# Patient Record
Sex: FEMALE | Race: WHITE | NOT HISPANIC OR LATINO | Employment: FULL TIME | URBAN - METROPOLITAN AREA
[De-identification: names, ages, dates, MRNs, and addresses within clinical notes are randomized per-mention and may not be internally consistent; named-entity substitution may affect disease eponyms.]

---

## 2017-02-07 ENCOUNTER — HOSPITAL ENCOUNTER (OUTPATIENT)
Dept: OBGYN | Facility: HOSPITAL | Age: 22
Discharge: HOME | End: 2017-02-07
Attending: OBSTETRICS & GYNECOLOGY | Admitting: OBSTETRICS & GYNECOLOGY

## 2017-02-07 ASSESSMENT — MIFFLIN-ST. JEOR: SCORE: 1762.4

## 2017-03-28 ENCOUNTER — ANESTHESIA - HEALTHEAST (OUTPATIENT)
Dept: OBGYN | Facility: CLINIC | Age: 22
End: 2017-03-28

## 2017-03-29 ENCOUNTER — HOME CARE/HOSPICE - HEALTHEAST (OUTPATIENT)
Dept: HOME HEALTH SERVICES | Facility: HOME HEALTH | Age: 22
End: 2017-03-29

## 2017-03-30 ENCOUNTER — HOME CARE/HOSPICE - HEALTHEAST (OUTPATIENT)
Dept: HOME HEALTH SERVICES | Facility: HOME HEALTH | Age: 22
End: 2017-03-30

## 2017-05-05 ENCOUNTER — RECORDS - HEALTHEAST (OUTPATIENT)
Dept: LAB | Facility: HOSPITAL | Age: 22
End: 2017-05-05

## 2017-05-08 LAB — HBV SURFACE AB SERPL IA-ACNC: NEGATIVE M[IU]/ML

## 2017-07-14 ENCOUNTER — OFFICE VISIT - HEALTHEAST (OUTPATIENT)
Dept: INTERNAL MEDICINE | Facility: CLINIC | Age: 22
End: 2017-07-14

## 2017-07-14 DIAGNOSIS — F41.9 ANXIETY: ICD-10-CM

## 2017-07-14 DIAGNOSIS — F32.A DEPRESSION: ICD-10-CM

## 2017-07-14 DIAGNOSIS — F41.0 PANIC ATTACKS: ICD-10-CM

## 2017-11-10 ENCOUNTER — OFFICE VISIT - HEALTHEAST (OUTPATIENT)
Dept: FAMILY MEDICINE | Facility: CLINIC | Age: 22
End: 2017-11-10

## 2017-11-10 DIAGNOSIS — J02.0 STREP PHARYNGITIS: ICD-10-CM

## 2017-11-10 DIAGNOSIS — R09.81 SINUS CONGESTION: ICD-10-CM

## 2017-11-21 ENCOUNTER — OFFICE VISIT - HEALTHEAST (OUTPATIENT)
Dept: FAMILY MEDICINE | Facility: CLINIC | Age: 22
End: 2017-11-21

## 2017-11-21 DIAGNOSIS — J01.90 ACUTE SINUSITIS: ICD-10-CM

## 2018-03-21 ENCOUNTER — COMMUNICATION - HEALTHEAST (OUTPATIENT)
Dept: SCHEDULING | Facility: CLINIC | Age: 23
End: 2018-03-21

## 2019-06-26 ENCOUNTER — COMMUNICATION - HEALTHEAST (OUTPATIENT)
Dept: SCHEDULING | Facility: CLINIC | Age: 24
End: 2019-06-26

## 2019-06-26 DIAGNOSIS — B85.0 HEAD LICE: ICD-10-CM

## 2021-01-06 LAB
ABO (EXTERNAL): NORMAL
HEMOGLOBIN (EXTERNAL): 13.7 G/DL (ref 12–16)
HEPATITIS B SURFACE ANTIGEN (EXTERNAL): NONREACTIVE
HIV1+2 AB SERPL QL IA: NEGATIVE
PLATELET COUNT (EXTERNAL): 421 10E3/UL (ref 150–400)
RH (EXTERNAL): POSITIVE
VDRL (SYPHILIS) (EXTERNAL): NONREACTIVE

## 2021-05-30 ENCOUNTER — RECORDS - HEALTHEAST (OUTPATIENT)
Dept: ADMINISTRATIVE | Facility: CLINIC | Age: 26
End: 2021-05-30

## 2021-05-30 VITALS — BODY MASS INDEX: 33.9 KG/M2 | WEIGHT: 216 LBS | HEIGHT: 67 IN

## 2021-05-30 NOTE — TELEPHONE ENCOUNTER
CC: Suspected Lice      No open wounds   Sees eggs and adults       Requesting RX for herself     Indicated her insurance would probably pay for OTC Nix if was prescribed       A/P:   > Reviewed approach to managing this in the home and use of NIX (or similar) per guidelines     > I will message your provider for Rx        Hua Solo RN   Triage and Medication Refills      Confirmed pharmacy       Hua Solo RN   Triage and Medication Refills      Reason for Disposition    [1] New-onset head lice AND [2] usually respond to OTC meds in community    Protocols used: HEAD LICE-A-AH

## 2021-05-31 VITALS — BODY MASS INDEX: 35.68 KG/M2 | WEIGHT: 227.8 LBS

## 2021-05-31 VITALS — BODY MASS INDEX: 35.79 KG/M2 | WEIGHT: 228.5 LBS

## 2021-05-31 VITALS — WEIGHT: 216.8 LBS | BODY MASS INDEX: 33.96 KG/M2

## 2021-06-06 ENCOUNTER — HOSPITAL ENCOUNTER (OUTPATIENT)
Dept: OBGYN | Facility: HOSPITAL | Age: 26
Discharge: HOME | End: 2021-06-06
Attending: OBSTETRICS & GYNECOLOGY | Admitting: OBSTETRICS & GYNECOLOGY

## 2021-06-06 ENCOUNTER — RECORDS - HEALTHEAST (OUTPATIENT)
Dept: ADMINISTRATIVE | Facility: OTHER | Age: 26
End: 2021-06-06

## 2021-06-06 RX ORDER — SWAB
1 SWAB, NON-MEDICATED MISCELLANEOUS DAILY
Status: SHIPPED | COMMUNITY
Start: 2021-06-06

## 2021-06-06 ASSESSMENT — MIFFLIN-ST. JEOR: SCORE: 2014.14

## 2021-06-08 NOTE — PROGRESS NOTES
[place] [facility] [place] states she was walking up my drive way and I slipped and fell right on my belly at approx 1600.  Pt denies any bleeding pt states she thinks she was leaking fluid it was clear, pt states she is not wearing a pd at this time. Pt states she hasn't felt baby move pt states she felt baby move prior to fall. Pt states she has had a gallon and a half of water today.  Pt placed on monitor.pt states she has not  Had intercourse in the last 24-48 hours  1840 pt off monitor to u/s via w/c.

## 2021-06-08 NOTE — PROGRESS NOTES
Dr. YUSRA Pappas notified of patient ROM plus results being negative, BPP of 8/8, MARCO of 15.4, patient is feeling baby move around a lot more now, and Reactive EFM. Dr. SHEREEN Pappas states that it is ok to discharge patient. Patient is to make an appointment to see him in the clinic before the weekend. Patient notified of test results and the need to schedule an appointment with Dr. Pappas before the weekend. Discharge papers printed and gone over with the patient. Questions answered and papers signed.

## 2021-06-09 NOTE — ANESTHESIA POSTPROCEDURE EVALUATION
Patient: Radha Curran  * No procedures listed *  Anesthesia type: regional    Patient location: Labor and Delivery  Last vitals:   Vitals:    03/28/17 0801   BP: 133/66   Pulse: 96   Resp:    Temp:    SpO2:      Post vital signs: stable  Level of consciousness: awake and responds to simple questions  Post-anesthesia pain: pain controlled  Post-anesthesia nausea and vomiting: no  Pulmonary: unassisted, return to baseline  Cardiovascular: stable and blood pressure at baseline  Hydration: adequate  Anesthetic events: no    QCDR Measures:  ASA# 11 - Dora-op Cardiac Arrest: ASA11B - Patient did NOT experience unanticipated cardiac arrest  ASA# 12 - Dora-op Mortality Rate: ASA12B - Patient did NOT die  ASA# 13 - PACU Re-Intubation Rate: ASA13B - Patient did NOT require a new airway mgmt  ASA# 10 - Composite Anes Safety: ASA10A - No serious adverse event  ASA# 38 - New Corneal Injury: ASA38A - No new exposure keratitis or corneal abrasion in PACU    Additional Notes:

## 2021-06-09 NOTE — ANESTHESIA PROCEDURE NOTES
Epidural Block    Patient location during procedure: OB  Time Called: 3/28/2017 2:04 AM  Reason for Block:labor epidural  Staffing:  Performing  Anesthesiologist: SEBASTIAN BURNHAM  Preanesthetic Checklist  Completed: patient identified, risks, benefits, and alternatives discussed, timeout performed, consent obtained, airway assessed, oxygen available, suction available, emergency drugs available and hand hygiene performed  Procedure  Patient position: sitting  Prep: ChloraPrep  Patient monitoring: continuous pulse oximetry, heart rate and blood pressure  Approach: midline  Location: L3-L4  Injection technique: ISABEL air  Number of Attempts:1  Needle  Needle type: Shell   Needle gauge: 17 G     Catheter in Space: 6  Assessment  Sensory level: T10  No complications

## 2021-06-09 NOTE — ANESTHESIA PREPROCEDURE EVALUATION
Anesthesia Evaluation      Patient summary reviewed     Airway   Mallampati: II  Neck ROM: full   Pulmonary - negative ROS and normal exam    breath sounds clear to auscultation                         Cardiovascular - negative ROS and normal exam  Rhythm: regular  Rate: normal,         Neuro/Psych - negative ROS     Endo/Other    (+) pregnant     GI/Hepatic/Renal - negative ROS           Dental - normal exam                        Anesthesia Plan  Planned anesthetic: epidural    ASA 2     Anesthetic plan and risks discussed with: patient    Post-op plan: routine recovery

## 2021-06-14 NOTE — PROGRESS NOTES
Assessment:      Strep throat.  Sinus congestion     Plan:      Patient placed on antibiotics.  Use of OTC analgesics recommended as well as salt water gargles.   Trial of nasal steroid and sudafed  Discussed signs of worsening infection and when to follow-up with PCP if no symptom improvement.     Patient Instructions   Sinus congestion    You were seen today for sinus congestion and/or pain. This is likely due to a viral illness.    Symptoms management:  - May use Tylenol or Ibuprofen for discomfort and/or fever if present  - May try saline irrigation to relieve congestion (see instructions below)  - Use of nasal steroids as prescribed  - If you are experiencing ear fullness, may try an oral decongestant such as sudafed    Reasons to come back for re-evaluation:  - Develop a fever of 102F (38.9C)  - Sudden and severe pain in the face and head  - Troubles seeing or double vision  - Swelling or redness around one or both eyes  - Sfiff neck  - Symptoms have not improved after 7 days    Buffered normal saline nasal irrigation   The benefits   1. Saline (saltwater) washes the mucus and irritants from your nose.   2. The sinus passages are moisturized.   3. Studies have also shown that a nasal irrigation improves cell function (the cells that move the mucus work better).   The recipe   Use a one-quart glass jar that is thoroughly cleansed.   You may use a large medical syringe (30 cc), water pick with an irrigation tip (preferred method), squeeze bottle, or Neti pot. Do not use a baby bulb syringe. The syringe or pick should be sterilized frequently or replaced every two to three weeks to avoid contamination and infection.   Fill with water that has been distilled, previously boiled, or otherwise sterilized. Plain tap water is not recommended, because it is not necessarily sterile.   Add 1 to 1  heaping teaspoons of pickling/skye salt. Do not use table salt, because it contains a large number of additives.   Add 1  teaspoon of baking soda (pure bicarbonate).   Mix ingredients together, and store at room temperature. Discard after one week.   You may also make up a solution from premixed packets that are commercially prepared specifically for nasal irrigation.   The instructions   Irrigate your nose with saline one to two times per day.   If you have been told to use nasal medication, you should always use your saline solution first. The nasal medication is much more effective when sprayed onto clean nasal membranes, and the spray will reach deeper into the nose.   Pour the amount of fluid you plan to use into a clean bowl. Do not put your used syringe back into the storage container, because it contaminates your solution.   You may warm the solution slightly in the microwave, but be sure that the solution is not hot.   Bend over the sink (some people do this in the shower) and squirt the solution into each side of your nose, aiming the stream toward the back of your head, not the top of your head. The solution should flow into one nostril and out of the other, but it will not harm you if you swallow a little.   Some people experience a little burning sensation the first few times they use buffered saline solution, but this usually goes away after they adapt to it.     Sore throat    Rapid Strep test was negative. We will still treat with a course of antibiotics based on your symptoms.    No notification means that overnight test returned negative.    May continue with symptomatic treatments including:  -salt water gargles  -warm beverages such as tea  -throat drops  -ibuprofen or Tylenol for pain or fever  -take full course of antibiotics, even if symptoms improve    If fevers not coming down with Tylenol or ibuprofen, shortness of breath, not tolerating oral liquid intake, drooling, or stiff neck, return for evaluation immediately. Otherwise, if no improvement in the next week, follow up with primary care  provider.        Subjective:       Radha Curran is a 22 y.o. female who presents for evaluation of a sore throat. Associated symptoms include change in voice, difficulty swallowing, and sinus pressure. Onset of symptoms was 4 days ago, gradually worsening since that time.  She is drinking plenty of fluids. She has not had recent close exposure to someone with proven streptococcal pharyngitis. Denies fevers, ear pain, cough, and vomiting.    The following portions of the patient's history were reviewed and updated as appropriate: allergies, current medications and problem list.    Review of Systems  Pertinent items are noted in HPI.    Allergies  No Known Allergies     Objective:      Vitals:    11/10/17 1226   BP: 100/60   Pulse: 86   Resp: 16   Temp: 98.1  F (36.7  C)   SpO2: 99%     General appearance: alert, appears stated age, cooperative, no distress and sounds congestion  Head: Normocephalic, without obvious abnormality, atraumatic  Ears: normal TM's and external ear canals both ears  Nose: sinuses tender to percussion R>L; nares normal, septum midline, no discharge present  Throat: moderate tonsil swelling bilaterally; exudate present on right; erythematous posterior oropharynx; lips and tongue normal  Neck: marked anterior cervical adenopathy and supple, symmetrical, trachea midline  Lungs: clear to auscultation bilaterally and no rhonchi, rales, or wheezing  Heart: regular rate and rhythm, S1, S2 normal, no murmur, click, rub or gallop    Lab Results    Negative rapid Group A strep    I personally reviewed these results and discussed findings with the patient.

## 2021-06-14 NOTE — PROGRESS NOTES
Chief Complaint   Patient presents with     Nasal Congestion     here 2 weeks ago done with Rx pt has not gotten any better        HPI:    Patient is here for 2 wks of nasal congestion with facial pressure, productive cough, and moderate sore throat, not improved after treatment for Strep throat with PCN. No fever, chills, chest pain, shortness of breath.    ROS: Pertinent ROS noted in HPI.     No Known Allergies    Patient Active Problem List   Diagnosis     Depression     Anxiety       Family History   Problem Relation Age of Onset     Alcohol abuse Mother      Drug abuse Mother      Alcohol abuse Father      Drug abuse Father      Diabetes Maternal Grandmother      Depression Maternal Grandmother      Anxiety disorder Maternal Grandmother      Drug abuse Maternal Grandmother      Alcohol abuse Maternal Grandmother      Drug abuse Maternal Grandfather      Alcohol abuse Maternal Grandfather      Alcohol abuse Paternal Grandmother      Alcohol abuse Paternal Grandfather        Social History     Social History     Marital status: Single     Spouse name: N/A     Number of children: 0     Years of education: N/A     Occupational History     Nursing Assistant       Social History Main Topics     Smoking status: Never Smoker     Smokeless tobacco: Never Used     Alcohol use No     Drug use: No     Sexual activity: Yes     Partners: Male     Birth control/ protection: Pill      Comment: just stop taking the pill last month      Other Topics Concern     Not on file     Social History Narrative       Objective:    Vitals:    11/21/17 0751   BP: 122/58   Pulse: (!) 105   Temp: 97.8  F (36.6  C)   SpO2: 99%     Gen:NAD  Oropharynx: normal throat, oral mucosa  Ears: normal TMs and canals  Nose: boggy nasal mucosa with purulent discharges  Sinus: pain to percussion of maxillary sinuses bilaterally  Neck:NAD  CV:RRR, no M, R, G  Pulm: CTAB, normal effort        Acute sinusitis  -     amoxicillin-clavulanate (AUGMENTIN) 875-125  mg per tablet; Take 1 tablet by mouth 2 (two) times a day for 10 days.  -     predniSONE (DELTASONE) 20 MG tablet; Take 2 tablets (40 mg total) by mouth daily for 5 days.        F/u in 5 days with PCP if no improvement.

## 2021-06-16 PROBLEM — F32.A DEPRESSION: Status: ACTIVE | Noted: 2017-07-23

## 2021-06-16 PROBLEM — F41.9 ANXIETY: Status: ACTIVE | Noted: 2017-07-23

## 2021-06-25 NOTE — PROGRESS NOTES
Progress Notes by Chano Simms at 7/14/2017 11:00 AM     Author: Chano Simms Service: -- Author Type: Nurse Practitioner    Filed: 7/23/2017  1:32 PM Encounter Date: 7/14/2017 Status: Signed    : Chano Simms [facility]    Date of Service: 7/14/2017  Primary Provider: HERMES Galeano    Patient Care Team:  HERMES Recio as PCP - General (Nurse Practitioner)     ______________________________________________________________________     Patient's Pharmacy:    [facility] - [place], [place] - [address] [place] [address]  Phone: [phone] Fax: [phone]    [id number] - [place], [place] - [address] AT [facility] [address]  [address]  [place] [address]  Phone: [phone] Fax: [phone]     Patient's Insurance:    Payor: BLUE CROSS / Plan: BLUE PLUS [place] / Product Type: PMAP /     ______________________________________________________________________    Assessment:    1. Depression    2. Anxiety    3. Panic attacks       ______________________________________________________________________      PHQ-2 Total Score: 6 (7/14/2017 10:00 AM)  PHQ-9 Total Score: 19 (7/14/2017 10:00 AM)     Plan:  Patient Instructions   1. Venlafaxine XR 37.5 mg by mouth - Take 1 cap by mouth daily x 1 week, increase to 2 caps daily until seen in follow up.  2. Lorazepam 0.5 mg by mouth every 6 hours as needed for increased anxiety and or panic attacks.  3. Continue therapy as before.    CRISIS LINE: [phone]   [facility]   [address] [place] [address]    Call the crisis line for immediate mental health support, 24 hours a day.   Crisis line staff might recommend a visit to [facility], or connect you with a mobile crisis team in your community      If you or someone you know is  "experiencing a medical emergency dial 911      ______________________________________________________________________     Radha Curran is 22 y.o. female who comes in today for:    Chief Complaint   Patient presents with   ? Depression     nothing new, was on prozac when she was younger but then didnt work and she gained a lot of weight and has gotten worse   ? Anxiety       Patient Active Problem List   Diagnosis   ? Depression   ? Anxiety     No past medical history on file.    Past Surgical History:   Procedure Laterality Date   ? ANTERIOR CRUCIATE LIGAMENT REPAIR Right      Current Outpatient Prescriptions   Medication Sig   ? LORazepam (ATIVAN) 0.5 MG tablet Take 1 tablet (0.5 mg total) by mouth every 6 (six) hours as needed for anxiety.   ? venlafaxine (EFFEXOR XR) 37.5 MG 24 hr capsule Take 1 cap (37.5 mg) by mouth daily x 1 week, then increase to 2 caps (75 mg) daily.     Social History     Social History   ? Marital status: Single     Spouse name: N/A   ? Number of children: 0   ? 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 Smoking status: Never Smoker   ? Smokeless tobacco: Never Used   ? Alcohol use No   ? Drug use: No   ? Sexual activity: Yes     Partners: Male     Birth control/ protection: Pill      Comment: just stop taking the pill last month      Other Topics Concern   ? Not on file     Social History Narrative     ______________________________________________________________________     History of present illness: Radha Curran is a pleasant 22 y.o. female who presents in clinic today for evaluation depression and anxiety.  She has been having more frequent panic attacks , anxiety and also depression.  She states that her parents are \"addicts\" - drug and chem dependency, both just started drug & alcohol treatment yesterday.  She had been taking prozac for about 2 years (age 18 yrs) and just wasn't working any longer, so she " weaned herself off of the medication about 2 years ago (age 20 yrs).  She states that she also gained a lot of weight and had increased depression and suicidal thoughts at that time. In the interim, she has been going to Psychologist weekly the last 4 months, has seen a few different providers, but most recently seeing Dr. Mirza Littlejohn at [facility] in [place].  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  She has tried alprazolam, her grandmother's, which made her feel high and relaxed on it and just wanted to sleep.  With regards to panic attacks, she has symptoms of shaking, fidgety, and heart races, and feels like she just can't sit still.  She has had them at work, home, and in social situations where she has had to leave. She will call her therapist to talk to somebody and has called her fiance to talk about it.  She also removes herself from the inciting situation and will take slow deep breaths to try to calm herself.  Family history of depression in her mother as a teen as well as her maternal grandmother with anxiety and depression and also her paternal grandmother with anxiety/depression.  Weight is back up and noted below.    *[place] reviewed today and no inappropriate prescriptions identified.    Review of systems:   On ROS, the patient denies headaches, auditory/visual hallucinations, chest pain, shortness of breath, suicidal or homicidal ideation or any plans to do so.  Positive for symptoms as noted in the HPI.    ______________________________________________________________________    Wt Readings from Last 3 Encounters:   07/14/17 216 lb 12.8 oz (98.3 kg)   03/27/17 173 lb (78.5 kg)   02/07/17 216 lb (98 kg) (98 %, Z= 2.16)*     * Growth percentiles are based on CDC 2-20 Years  data.     BP Readings from Last 3 Encounters:   07/14/17 110/68   03/30/17 108/66   03/29/17 107/79     /68 (Patient Site: Right Arm, Patient Position: Sitting, Cuff Size: Adult Large)  Pulse 66  Wt 216 lb 12.8 oz (98.3 kg)  LMP 06/27/2017  Breastfeeding? No  BMI 33.96 kg/m2     Physical Exam:  General Appearance: Alert, cooperative, no distress, appears stated age  Head: Normocephalic, without obvious abnormality, atraumatic  Eyes: PERRL, conjunctiva/corneas clear, EOM's intact  Nose: Nares normal, septum midline,mucosa normal, no drainage  Throat: Lips, mucosa, and tongue normal; teeth and gums normal, no erythema, exudate, or thrush  Neck: Supple, symmetrical, trachea midline, no adenopathy;  thyroid: not enlarged, symmetric, no tenderness/mass/nodules  Lungs: Clear to auscultation bilaterally, respirations unlabored  Heart: Regular rate and rhythm, S1 and S2 normal, no murmur, rub, or gallop  Abdomen: Soft, non-tender, bowel sounds active all four quadrants  Lymph nodes: Cervical & supraclavicular nodes normal  Neurologic: She is alert & oriented x 3.  Psychiatric: She has a normal mood and affect.       Chano Simms CNP  Internal Medicine  [facility]     Return in about 6 weeks (around 8/25/2017), or if symptoms worsen or fail to improve.

## 2021-06-26 NOTE — PROGRESS NOTES
Dr No in to evaluate patient.  Discharge orders obtained.   Discharge instructions reviewed with patient and patient understanding of plan and currently has next clinic visit scheduled for 6/10/2021.  No other questions or concerns expressed.

## 2021-06-26 NOTE — H&P
OB TRIAGE    Date: 2021  NAME: Radha Curran  : 1995  MRN: [id number]     CC: foot swelling     HPI: Radha Curran is a 26 y.o. female,  with  single inter-uterine gestation at 33w6d, with Estimated Date of Delivery: 21. She presented to L&D with a swollen foot this morning that is now resolved . She was unable to put her shoe on this morning however the swelling is now completely resolved.  She denies tenderness or redness in the leg.   Pregnancy has been complicated by gestational hypertension. Patient denies headache, visual changes, RUQ pain. She reports good fetal movement.    OB HISTORY   OB History    Para Term  AB Living   3 1 1   1 1   SAB TAB Ectopic Multiple Live Births   1     0 1      # Outcome Date GA Lbr /2nd Weight Sex Delivery Anes PTL Lv   3 Current            2 Term 17 39w4d 12:00 / 01:01 8 lb 1 oz (3.657 kg) F Vag-Vacuum EPI N RIC   1 SAB                PAST MEDICAL HISTORY:  History reviewed. No pertinent past medical history.     PAST SURGICAL HISTORY:   Past Surgical History:   Procedure Laterality Date     ANTERIOR CRUCIATE LIGAMENT REPAIR Right     Patient reports no surgery, external cast only        SOCIAL HISTORY  Reviewed, patient denies smoking, alcohol and drug use  She is  . Father is  involved    MEDICATIONS  No current facility-administered medications on file prior to encounter.      Current Outpatient Medications on File Prior to Encounter   Medication Sig Dispense Refill     prenatal vitamin iron-folic acid 27mg-0.8mg (PRENATAL S) 27 mg iron- 800 mcg Tab tablet Take 1 tablet by mouth daily.       [DISCONTINUED] LORazepam (ATIVAN) 0.5 MG tablet Take 1 tablet (0.5 mg total) by mouth every 6 (six) hours as needed for anxiety. 60 tablet 0     [DISCONTINUED] permethrin (NIX) 1 % liquid Apply to scalp/hair and leave on for 10 minutes then rinse. Repeat in 7 days if not resolved 1 Bottle 1       ALLERGIES  Allergies   Allergen  Reactions     Fentanyl Nausea And Vomiting     Projectile vomiting       ROS: otherwise negative except what is stated in HPI.     PHYSICAL EXAM   /61   Pulse 89   Temp 98  F (36.7  C) (Oral)   Resp 18   LMP 10/12/2020    Gen: no acute distress, resting comfortably   CV: acyanotic   Heart: regular rate and rhythm   Pulm: unlabored respirations    Abd: gravid   Cervix: deferred  Extremities: soft, no edema, no erythema, no tenderness  FHR: positive, category 1  Tira: no contractions      LABS  Prenatal Labs  Result Component Result Previous Result   ABO/Rh External Result A Positive (2016) No Results    A Positive (2016)    Hemoglobin External Result 12.4 (2017) 13.2 (2016)   Rubella External Result Immune (2016) No Results        IMPRESSION  26 y.o.  at 33w6d   Gestational hypertension   - normal blood pressure in triage  Unilateral foot swelling - now resolved      PLAN  DVT warning signs discussed  Follow up as scheduled with Dr. Dwain No, DO

## 2021-06-26 NOTE — PROGRESS NOTES
Radha, , at 33 6/7 weeks here today due to right ankle/foot swelling noted this morning upon getting out of bed.  She states she was unable to get her sandals on.  When she arrived this afternoon the swelling had resolved, but she had spoken to the doctor on call who wanted her to be evaluated.  Radha reports some elevated BP readings in the clinic of which she reports they had drawn labs for and had her get BPP last week, Thursday.  She reports she was told things were normal.   working on obtaining prenatal records.      Dr No, In House [facility], informed of patient arrival and assessment.  BP WNL in 120s/60s.  [place] denies any headaches, visual changes or epigastric pains.  LE edema non pitting and equal bilaterally.  Reflexes UE and LE WNL +2 and no clonus.  No complaints of leg tenderness or pain, no redness noted.

## 2021-06-27 ENCOUNTER — HEALTH MAINTENANCE LETTER (OUTPATIENT)
Age: 26
End: 2021-06-27

## 2021-06-29 LAB — GROUP B STREPTOCOCCUS (EXTERNAL): NEGATIVE

## 2021-07-06 VITALS — BODY MASS INDEX: 40.62 KG/M2 | WEIGHT: 268 LBS | HEIGHT: 68 IN

## 2021-07-07 ENCOUNTER — HOSPITAL ENCOUNTER (OUTPATIENT)
Dept: OBGYN | Facility: CLINIC | Age: 26
Discharge: HOME | End: 2021-07-07
Attending: OBSTETRICS & GYNECOLOGY | Admitting: OBSTETRICS & GYNECOLOGY

## 2021-07-07 DIAGNOSIS — H66.002 NON-RECURRENT ACUTE SUPPURATIVE OTITIS MEDIA OF LEFT EAR WITHOUT SPONTANEOUS RUPTURE OF TYMPANIC MEMBRANE: ICD-10-CM

## 2021-07-07 RX ORDER — AMOXICILLIN 500 MG/1
500 CAPSULE ORAL 2 TIMES DAILY
Qty: 10 CAPSULE | Refills: 0 | Status: SHIPPED | OUTPATIENT
Start: 2021-07-07 | End: 2021-07-12

## 2021-07-08 ENCOUNTER — DOCUMENTATION ONLY (OUTPATIENT)
Dept: ADMINISTRATIVE | Facility: OTHER | Age: 26
End: 2021-07-08

## 2021-07-08 NOTE — PROGRESS NOTES
This encounter was created as part of manual pregnancy episode data conversion for the single EHR project. The following information (where applicable) was manually abstracted from the [employer] Epic instance on July 8, 2021: pregnancy episode name/date, dating, episode encounter linking, pregravid weight, number of fetuses, and pregnancy overview and plan.     Genoveva Quiros RN   Clinical Informatics

## 2021-07-16 ENCOUNTER — LAB (OUTPATIENT)
Dept: LAB | Facility: CLINIC | Age: 26
End: 2021-07-16
Attending: OBSTETRICS & GYNECOLOGY
Payer: COMMERCIAL

## 2021-07-16 DIAGNOSIS — Z33.1 PREGNANT STATE, INCIDENTAL: ICD-10-CM

## 2021-07-16 DIAGNOSIS — Z33.1 PREGNANT STATE, INCIDENTAL: Primary | ICD-10-CM

## 2021-07-16 PROCEDURE — U0005 INFEC AGEN DETEC AMPLI PROBE: HCPCS

## 2021-07-16 PROCEDURE — U0003 INFECTIOUS AGENT DETECTION BY NUCLEIC ACID (DNA OR RNA); SEVERE ACUTE RESPIRATORY SYNDROME CORONAVIRUS 2 (SARS-COV-2) (CORONAVIRUS DISEASE [COVID-19]), AMPLIFIED PROBE TECHNIQUE, MAKING USE OF HIGH THROUGHPUT TECHNOLOGIES AS DESCRIBED BY CMS-2020-01-R: HCPCS

## 2021-07-18 LAB — SARS-COV-2 RNA RESP QL NAA+PROBE: NEGATIVE

## 2021-07-19 ENCOUNTER — ANESTHESIA (OUTPATIENT)
Dept: OBGYN | Facility: CLINIC | Age: 26
End: 2021-07-19
Payer: COMMERCIAL

## 2021-07-19 ENCOUNTER — ANESTHESIA EVENT (OUTPATIENT)
Dept: OBGYN | Facility: CLINIC | Age: 26
End: 2021-07-19
Payer: COMMERCIAL

## 2021-07-19 ENCOUNTER — HOSPITAL ENCOUNTER (INPATIENT)
Facility: CLINIC | Age: 26
LOS: 2 days | Discharge: HOME | End: 2021-07-21
Attending: OBSTETRICS & GYNECOLOGY | Admitting: OBSTETRICS & GYNECOLOGY
Payer: COMMERCIAL

## 2021-07-19 DIAGNOSIS — Z3A.40 40 WEEKS GESTATION OF PREGNANCY: Primary | ICD-10-CM

## 2021-07-19 PROBLEM — Z34.90 PREGNANCY: Status: ACTIVE | Noted: 2021-07-19

## 2021-07-19 PROBLEM — Z36.89 ENCOUNTER FOR TRIAGE IN PREGNANT PATIENT: Status: ACTIVE | Noted: 2021-07-19

## 2021-07-19 LAB
ABO/RH(D): NORMAL
ANTIBODY SCREEN: NEGATIVE
APTT PPP: 28 SECONDS (ref 22–38)
D DIMER PPP FEU-MCNC: >20 UG/ML FEU (ref 0–0.5)
FIBRINOGEN PPP-MCNC: 438 MG/DL (ref 170–490)
HGB BLD-MCNC: 7.5 G/DL (ref 11.7–15.7)
HGB BLD-MCNC: 9.1 G/DL (ref 11.7–15.7)
INR PPP: 1.18 (ref 0.85–1.15)
PLATELET # BLD AUTO: 376 10E3/UL (ref 150–450)
SPECIMEN EXPIRATION DATE: NORMAL

## 2021-07-19 PROCEDURE — 250N000011 HC RX IP 250 OP 636

## 2021-07-19 PROCEDURE — 85384 FIBRINOGEN ACTIVITY: CPT | Performed by: OBSTETRICS & GYNECOLOGY

## 2021-07-19 PROCEDURE — 258N000003 HC RX IP 258 OP 636

## 2021-07-19 PROCEDURE — 86900 BLOOD TYPING SEROLOGIC ABO: CPT

## 2021-07-19 PROCEDURE — 0HQ9XZZ REPAIR PERINEUM SKIN, EXTERNAL APPROACH: ICD-10-PCS | Performed by: OBSTETRICS & GYNECOLOGY

## 2021-07-19 PROCEDURE — 85049 AUTOMATED PLATELET COUNT: CPT | Performed by: OBSTETRICS & GYNECOLOGY

## 2021-07-19 PROCEDURE — 999N000016 HC STATISTIC ATTENDANCE AT DELIVERY

## 2021-07-19 PROCEDURE — 3E0R3BZ INTRODUCTION OF ANESTHETIC AGENT INTO SPINAL CANAL, PERCUTANEOUS APPROACH: ICD-10-PCS | Performed by: ANESTHESIOLOGY

## 2021-07-19 PROCEDURE — 85018 HEMOGLOBIN: CPT | Performed by: OBSTETRICS & GYNECOLOGY

## 2021-07-19 PROCEDURE — 722N000001 HC LABOR CARE VAGINAL DELIVERY SINGLE

## 2021-07-19 PROCEDURE — 36415 COLL VENOUS BLD VENIPUNCTURE: CPT

## 2021-07-19 PROCEDURE — 250N000013 HC RX MED GY IP 250 OP 250 PS 637

## 2021-07-19 PROCEDURE — 250N000009 HC RX 250: Performed by: OBSTETRICS & GYNECOLOGY

## 2021-07-19 PROCEDURE — 3E033VJ INTRODUCTION OF OTHER HORMONE INTO PERIPHERAL VEIN, PERCUTANEOUS APPROACH: ICD-10-PCS | Performed by: OBSTETRICS & GYNECOLOGY

## 2021-07-19 PROCEDURE — 999N000157 HC STATISTIC RCP TIME EA 10 MIN

## 2021-07-19 PROCEDURE — 85730 THROMBOPLASTIN TIME PARTIAL: CPT | Performed by: OBSTETRICS & GYNECOLOGY

## 2021-07-19 PROCEDURE — 250N000011 HC RX IP 250 OP 636: Performed by: ANESTHESIOLOGY

## 2021-07-19 PROCEDURE — 258N000003 HC RX IP 258 OP 636: Performed by: OBSTETRICS & GYNECOLOGY

## 2021-07-19 PROCEDURE — 370N000003 HC ANESTHESIA WARD SERVICE

## 2021-07-19 PROCEDURE — 85610 PROTHROMBIN TIME: CPT | Performed by: OBSTETRICS & GYNECOLOGY

## 2021-07-19 PROCEDURE — 120N000001 HC R&B MED SURG/OB

## 2021-07-19 PROCEDURE — 00HU33Z INSERTION OF INFUSION DEVICE INTO SPINAL CANAL, PERCUTANEOUS APPROACH: ICD-10-PCS | Performed by: ANESTHESIOLOGY

## 2021-07-19 PROCEDURE — 250N000009 HC RX 250

## 2021-07-19 PROCEDURE — 250N000009 HC RX 250: Performed by: ANESTHESIOLOGY

## 2021-07-19 PROCEDURE — 36415 COLL VENOUS BLD VENIPUNCTURE: CPT | Performed by: OBSTETRICS & GYNECOLOGY

## 2021-07-19 PROCEDURE — 85379 FIBRIN DEGRADATION QUANT: CPT | Performed by: OBSTETRICS & GYNECOLOGY

## 2021-07-19 PROCEDURE — 10907ZC DRAINAGE OF AMNIOTIC FLUID, THERAPEUTIC FROM PRODUCTS OF CONCEPTION, VIA NATURAL OR ARTIFICIAL OPENING: ICD-10-PCS | Performed by: OBSTETRICS & GYNECOLOGY

## 2021-07-19 RX ORDER — OXYTOCIN/0.9 % SODIUM CHLORIDE 30/500 ML
100-340 PLASTIC BAG, INJECTION (ML) INTRAVENOUS CONTINUOUS PRN
Status: DISCONTINUED | OUTPATIENT
Start: 2021-07-19 | End: 2021-07-21 | Stop reason: HOSPADM

## 2021-07-19 RX ORDER — MISOPROSTOL 200 UG/1
800 TABLET ORAL
Status: DISCONTINUED | OUTPATIENT
Start: 2021-07-19 | End: 2021-07-21 | Stop reason: HOSPADM

## 2021-07-19 RX ORDER — MODIFIED LANOLIN
OINTMENT (GRAM) TOPICAL
Status: DISCONTINUED | OUTPATIENT
Start: 2021-07-19 | End: 2021-07-21 | Stop reason: HOSPADM

## 2021-07-19 RX ORDER — METHYLERGONOVINE MALEATE 0.2 MG/ML
200 INJECTION INTRAVENOUS
Status: DISCONTINUED | OUTPATIENT
Start: 2021-07-19 | End: 2021-07-19 | Stop reason: HOSPADM

## 2021-07-19 RX ORDER — CARBOPROST TROMETHAMINE 250 UG/ML
250 INJECTION, SOLUTION INTRAMUSCULAR
Status: DISCONTINUED | OUTPATIENT
Start: 2021-07-19 | End: 2021-07-19 | Stop reason: HOSPADM

## 2021-07-19 RX ORDER — OXYTOCIN/0.9 % SODIUM CHLORIDE 30/500 ML
340 PLASTIC BAG, INJECTION (ML) INTRAVENOUS CONTINUOUS PRN
Status: DISCONTINUED | OUTPATIENT
Start: 2021-07-19 | End: 2021-07-21 | Stop reason: HOSPADM

## 2021-07-19 RX ORDER — METOCLOPRAMIDE 10 MG/1
10 TABLET ORAL EVERY 6 HOURS PRN
Status: DISCONTINUED | OUTPATIENT
Start: 2021-07-19 | End: 2021-07-19 | Stop reason: HOSPADM

## 2021-07-19 RX ORDER — PRENATAL VIT/IRON FUM/FOLIC AC 27MG-0.8MG
1 TABLET ORAL DAILY
COMMUNITY

## 2021-07-19 RX ORDER — MISOPROSTOL 200 UG/1
400 TABLET ORAL
Status: DISCONTINUED | OUTPATIENT
Start: 2021-07-19 | End: 2021-07-19 | Stop reason: HOSPADM

## 2021-07-19 RX ORDER — EPHEDRINE SULFATE 50 MG/ML
5 INJECTION, SOLUTION INTRAMUSCULAR; INTRAVENOUS; SUBCUTANEOUS
Status: DISCONTINUED | OUTPATIENT
Start: 2021-07-19 | End: 2021-07-19 | Stop reason: HOSPADM

## 2021-07-19 RX ORDER — ACETAMINOPHEN 325 MG/1
650 TABLET ORAL EVERY 4 HOURS PRN
Status: DISCONTINUED | OUTPATIENT
Start: 2021-07-19 | End: 2021-07-21 | Stop reason: HOSPADM

## 2021-07-19 RX ORDER — SODIUM CHLORIDE, SODIUM LACTATE, POTASSIUM CHLORIDE, CALCIUM CHLORIDE 600; 310; 30; 20 MG/100ML; MG/100ML; MG/100ML; MG/100ML
INJECTION, SOLUTION INTRAVENOUS CONTINUOUS
Status: DISCONTINUED | OUTPATIENT
Start: 2021-07-19 | End: 2021-07-21 | Stop reason: HOSPADM

## 2021-07-19 RX ORDER — CARBOPROST TROMETHAMINE 250 UG/ML
250 INJECTION, SOLUTION INTRAMUSCULAR
Status: DISCONTINUED | OUTPATIENT
Start: 2021-07-19 | End: 2021-07-21 | Stop reason: HOSPADM

## 2021-07-19 RX ORDER — DOCUSATE SODIUM 100 MG/1
100 CAPSULE, LIQUID FILLED ORAL DAILY
Status: DISCONTINUED | OUTPATIENT
Start: 2021-07-19 | End: 2021-07-21 | Stop reason: HOSPADM

## 2021-07-19 RX ORDER — ACETAMINOPHEN 325 MG/1
650 TABLET ORAL EVERY 4 HOURS PRN
Status: DISCONTINUED | OUTPATIENT
Start: 2021-07-19 | End: 2021-07-19 | Stop reason: HOSPADM

## 2021-07-19 RX ORDER — LIDOCAINE 40 MG/G
CREAM TOPICAL
Status: DISCONTINUED | OUTPATIENT
Start: 2021-07-19 | End: 2021-07-19 | Stop reason: HOSPADM

## 2021-07-19 RX ORDER — LIDOCAINE HYDROCHLORIDE AND EPINEPHRINE 15; 5 MG/ML; UG/ML
INJECTION, SOLUTION EPIDURAL PRN
Status: DISCONTINUED | OUTPATIENT
Start: 2021-07-19 | End: 2021-07-19

## 2021-07-19 RX ORDER — LIDOCAINE 40 MG/G
CREAM TOPICAL
Status: DISCONTINUED | OUTPATIENT
Start: 2021-07-19 | End: 2021-07-21 | Stop reason: HOSPADM

## 2021-07-19 RX ORDER — KETOROLAC TROMETHAMINE 30 MG/ML
30 INJECTION, SOLUTION INTRAMUSCULAR; INTRAVENOUS
Status: DISCONTINUED | OUTPATIENT
Start: 2021-07-19 | End: 2021-07-21 | Stop reason: HOSPADM

## 2021-07-19 RX ORDER — METHYLERGONOVINE MALEATE 0.2 MG/ML
200 INJECTION INTRAVENOUS
Status: DISCONTINUED | OUTPATIENT
Start: 2021-07-19 | End: 2021-07-21 | Stop reason: HOSPADM

## 2021-07-19 RX ORDER — OXYTOCIN 10 [USP'U]/ML
10 INJECTION, SOLUTION INTRAMUSCULAR; INTRAVENOUS
Status: DISCONTINUED | OUTPATIENT
Start: 2021-07-19 | End: 2021-07-19 | Stop reason: HOSPADM

## 2021-07-19 RX ORDER — ONDANSETRON 4 MG/1
4 TABLET, ORALLY DISINTEGRATING ORAL EVERY 6 HOURS PRN
Status: DISCONTINUED | OUTPATIENT
Start: 2021-07-19 | End: 2021-07-19 | Stop reason: HOSPADM

## 2021-07-19 RX ORDER — PROCHLORPERAZINE MALEATE 10 MG
10 TABLET ORAL EVERY 6 HOURS PRN
Status: DISCONTINUED | OUTPATIENT
Start: 2021-07-19 | End: 2021-07-19 | Stop reason: HOSPADM

## 2021-07-19 RX ORDER — METOCLOPRAMIDE HYDROCHLORIDE 5 MG/ML
10 INJECTION INTRAMUSCULAR; INTRAVENOUS EVERY 6 HOURS PRN
Status: DISCONTINUED | OUTPATIENT
Start: 2021-07-19 | End: 2021-07-19 | Stop reason: HOSPADM

## 2021-07-19 RX ORDER — BUPIVACAINE HYDROCHLORIDE 2.5 MG/ML
INJECTION, SOLUTION EPIDURAL; INFILTRATION; INTRACAUDAL PRN
Status: DISCONTINUED | OUTPATIENT
Start: 2021-07-19 | End: 2021-07-19

## 2021-07-19 RX ORDER — OXYTOCIN/0.9 % SODIUM CHLORIDE 30/500 ML
1-24 PLASTIC BAG, INJECTION (ML) INTRAVENOUS CONTINUOUS
Status: DISCONTINUED | OUTPATIENT
Start: 2021-07-19 | End: 2021-07-21 | Stop reason: HOSPADM

## 2021-07-19 RX ORDER — IBUPROFEN 600 MG/1
600 TABLET, FILM COATED ORAL
Status: DISCONTINUED | OUTPATIENT
Start: 2021-07-19 | End: 2021-07-21 | Stop reason: HOSPADM

## 2021-07-19 RX ORDER — HYDROCORTISONE 2.5 %
CREAM (GRAM) TOPICAL 3 TIMES DAILY PRN
Status: DISCONTINUED | OUTPATIENT
Start: 2021-07-19 | End: 2021-07-21 | Stop reason: HOSPADM

## 2021-07-19 RX ORDER — NALOXONE HYDROCHLORIDE 0.4 MG/ML
0.4 INJECTION, SOLUTION INTRAMUSCULAR; INTRAVENOUS; SUBCUTANEOUS
Status: DISCONTINUED | OUTPATIENT
Start: 2021-07-19 | End: 2021-07-19 | Stop reason: HOSPADM

## 2021-07-19 RX ORDER — IBUPROFEN 800 MG/1
800 TABLET, FILM COATED ORAL EVERY 6 HOURS PRN
Status: DISCONTINUED | OUTPATIENT
Start: 2021-07-19 | End: 2021-07-21 | Stop reason: HOSPADM

## 2021-07-19 RX ORDER — NALOXONE HYDROCHLORIDE 0.4 MG/ML
0.2 INJECTION, SOLUTION INTRAMUSCULAR; INTRAVENOUS; SUBCUTANEOUS
Status: DISCONTINUED | OUTPATIENT
Start: 2021-07-19 | End: 2021-07-19 | Stop reason: HOSPADM

## 2021-07-19 RX ORDER — NALBUPHINE HYDROCHLORIDE 10 MG/ML
2.5-5 INJECTION, SOLUTION INTRAMUSCULAR; INTRAVENOUS; SUBCUTANEOUS EVERY 6 HOURS PRN
Status: DISCONTINUED | OUTPATIENT
Start: 2021-07-19 | End: 2021-07-21 | Stop reason: HOSPADM

## 2021-07-19 RX ORDER — OXYTOCIN/0.9 % SODIUM CHLORIDE 30/500 ML
1-24 PLASTIC BAG, INJECTION (ML) INTRAVENOUS CONTINUOUS
Status: DISCONTINUED | OUTPATIENT
Start: 2021-07-19 | End: 2021-07-19 | Stop reason: HOSPADM

## 2021-07-19 RX ORDER — BUPIVACAINE HCL/0.9 % NACL/PF 0.125 %
PREFILLED PUMP RESERVOIR EPIDURAL
Status: DISCONTINUED | OUTPATIENT
Start: 2021-07-19 | End: 2021-07-19 | Stop reason: HOSPADM

## 2021-07-19 RX ORDER — OXYTOCIN 10 [USP'U]/ML
10 INJECTION, SOLUTION INTRAMUSCULAR; INTRAVENOUS
Status: DISCONTINUED | OUTPATIENT
Start: 2021-07-19 | End: 2021-07-21 | Stop reason: HOSPADM

## 2021-07-19 RX ORDER — BISACODYL 10 MG
10 SUPPOSITORY, RECTAL RECTAL DAILY PRN
Status: DISCONTINUED | OUTPATIENT
Start: 2021-07-19 | End: 2021-07-21 | Stop reason: HOSPADM

## 2021-07-19 RX ORDER — ONDANSETRON 2 MG/ML
4 INJECTION INTRAMUSCULAR; INTRAVENOUS EVERY 6 HOURS PRN
Status: DISCONTINUED | OUTPATIENT
Start: 2021-07-19 | End: 2021-07-19 | Stop reason: HOSPADM

## 2021-07-19 RX ORDER — OXYTOCIN/0.9 % SODIUM CHLORIDE 30/500 ML
340 PLASTIC BAG, INJECTION (ML) INTRAVENOUS CONTINUOUS PRN
Status: DISCONTINUED | OUTPATIENT
Start: 2021-07-19 | End: 2021-07-19 | Stop reason: HOSPADM

## 2021-07-19 RX ORDER — MISOPROSTOL 200 UG/1
800 TABLET ORAL
Status: DISCONTINUED | OUTPATIENT
Start: 2021-07-19 | End: 2021-07-19 | Stop reason: HOSPADM

## 2021-07-19 RX ORDER — MISOPROSTOL 200 UG/1
400 TABLET ORAL
Status: DISCONTINUED | OUTPATIENT
Start: 2021-07-19 | End: 2021-07-21 | Stop reason: HOSPADM

## 2021-07-19 RX ORDER — PROCHLORPERAZINE 25 MG
25 SUPPOSITORY, RECTAL RECTAL EVERY 12 HOURS PRN
Status: DISCONTINUED | OUTPATIENT
Start: 2021-07-19 | End: 2021-07-19 | Stop reason: HOSPADM

## 2021-07-19 RX ADMIN — SODIUM CHLORIDE, POTASSIUM CHLORIDE, SODIUM LACTATE AND CALCIUM CHLORIDE: 600; 310; 30; 20 INJECTION, SOLUTION INTRAVENOUS at 23:01

## 2021-07-19 RX ADMIN — Medication 2 MILLI-UNITS/MIN: at 11:08

## 2021-07-19 RX ADMIN — Medication 100 ML/HR: at 16:57

## 2021-07-19 RX ADMIN — Medication 340 ML/HR: at 11:19

## 2021-07-19 RX ADMIN — BUPIVACAINE HYDROCHLORIDE 5 MG: 2.5 INJECTION, SOLUTION EPIDURAL; INFILTRATION; INTRACAUDAL at 10:58

## 2021-07-19 RX ADMIN — LIDOCAINE HYDROCHLORIDE,EPINEPHRINE BITARTRATE 3 MG: 15; .005 INJECTION, SOLUTION EPIDURAL; INFILTRATION; INTRACAUDAL; PERINEURAL at 10:32

## 2021-07-19 RX ADMIN — IBUPROFEN 800 MG: 800 TABLET ORAL at 23:00

## 2021-07-19 RX ADMIN — MISOPROSTOL 800 MCG: 200 TABLET ORAL at 17:15

## 2021-07-19 RX ADMIN — METHYLERGONOVINE MALEATE 200 MCG: 0.2 INJECTION, SOLUTION INTRAMUSCULAR; INTRAVENOUS at 17:18

## 2021-07-19 RX ADMIN — SODIUM CHLORIDE, POTASSIUM CHLORIDE, SODIUM LACTATE AND CALCIUM CHLORIDE 1000 ML: 600; 310; 30; 20 INJECTION, SOLUTION INTRAVENOUS at 10:00

## 2021-07-19 RX ADMIN — LIDOCAINE HYDROCHLORIDE 20 ML: 10 INJECTION, SOLUTION EPIDURAL; INFILTRATION; INTRACAUDAL; PERINEURAL at 11:25

## 2021-07-19 RX ADMIN — KETOROLAC TROMETHAMINE 30 MG: 30 INJECTION, SOLUTION INTRAMUSCULAR; INTRAVENOUS at 12:21

## 2021-07-19 RX ADMIN — SODIUM CHLORIDE, POTASSIUM CHLORIDE, SODIUM LACTATE AND CALCIUM CHLORIDE: 600; 310; 30; 20 INJECTION, SOLUTION INTRAVENOUS at 17:15

## 2021-07-19 RX ADMIN — LIDOCAINE HYDROCHLORIDE,EPINEPHRINE BITARTRATE 3 MG: 15; .005 INJECTION, SOLUTION EPIDURAL; INFILTRATION; INTRACAUDAL; PERINEURAL at 10:30

## 2021-07-19 ASSESSMENT — MIFFLIN-ST. JEOR: SCORE: 2050.43

## 2021-07-19 NOTE — PLAN OF CARE
Problem: Adjustment to Role Transition (Postpartum Vaginal Delivery)  Goal: Successful Maternal Role Transition  Outcome: Improving     Problem: Bleeding (Postpartum Vaginal Delivery)  Goal: Hemostasis  Outcome: Improving     Problem: Pain (Postpartum Vaginal Delivery)  Goal: Acceptable Pain Control  Outcome: Improving

## 2021-07-19 NOTE — H&P
OB ADMISSION     Date: 2021  NAME: Radha Gonzalez  : 1995  MRN: [id number]     CC: I am in labor     HPI: Radha Gonzalez is a 26 year old female, @[facility]@ with  single inter-uterine gestation at 40w0d, with Estimated Date of Delivery: 2021. She presented to L&D with having contractions .  Pregnancy has been complicated by None. Patient denies headache, visual changes, RUQ pain. She reports good fetal movement.    OB HISTORY   OB History    Para Term  AB Living   3 1 1 0 1 1   SAB TAB Ectopic Multiple Live Births   1 0 0 0 1      # Outcome Date GA Lbr /2nd Weight Sex Delivery Anes PTL Lv   3 Current            2 Term 2017 40w0d   F    RIC   1 2015 8w0d              PAST MEDICAL HISTORY:  History reviewed. No pertinent past medical history.     PAST SURGICAL HISTORY:   History reviewed. No pertinent surgical history.     SOCIAL HISTORY  Reviewed, patient denies smoking, alcohol and drug use  She is  . Father is  involved    MEDICATIONS  Current Facility-Administered Medications   Medication     acetaminophen (TYLENOL) tablet 650 mg     carboprost (HEMABATE) injection 250 mcg     ketorolac (TORADOL) injection 30 mg    Or     ketorolac (TORADOL) injection 30 mg    Or     ibuprofen (ADVIL/MOTRIN) tablet 600 mg     lactated ringers BOLUS 1,000 mL    Or     lactated ringers BOLUS 500 mL     lactated ringers BOLUS 500 mL     lidocaine 1 % 0.1-20 mL     methylergonovine (METHERGINE) injection 200 mcg     metoclopramide (REGLAN) injection 10 mg    Or     metoclopramide (REGLAN) tablet 10 mg     misoprostol (CYTOTEC) tablet 400 mcg    Or     misoprostol (CYTOTEC) tablet 800 mcg     naloxone (NARCAN) injection 0.2 mg    Or     naloxone (NARCAN) injection 0.4 mg    Or     naloxone (NARCAN) injection 0.2 mg    Or     naloxone (NARCAN) injection 0.4 mg     nitrous oxide/oxygen 50/50 blend     ondansetron (ZOFRAN-ODT) ODT tab 4 mg    Or     ondansetron (ZOFRAN) injection 4 mg  "    oxytocin (PITOCIN) 30 units in 500 mL 0.9% NaCl infusion     oxytocin (PITOCIN) 30 units in 500 mL 0.9% NaCl infusion     oxytocin (PITOCIN) injection 10 Units     oxytocin (PITOCIN) injection 10 Units     prochlorperazine (COMPAZINE) injection 10 mg    Or     prochlorperazine (COMPAZINE) tablet 10 mg    Or     prochlorperazine (COMPAZINE) suppository 25 mg     tranexamic acid (CYKLOKAPRON) bolus 1 g vial attach to NaCl 50 or 100 mL bag ADULT       ALLERGIES  Allergies   Allergen Reactions     Fentanyl Nausea and Hives       ROS: otherwise negative except what is stated in HPI.     PHYSICAL EXAM   /62   Pulse 108   Temp 98.3  F (36.8  C) (Oral)   Resp 16   Ht 1.727 m (5' 8\")   Wt 125.2 kg (276 lb)   BMI 41.97 kg/m     Gen: no acute distress, resting comfortably   CV: acyanotic   Heart: regular rate and rhythm   Pulm: unlabored respirations, clear to ausculation bilaterally    Abd: gravid, soft, nontender   Cervix: 3  Extremities: soft, nontender   FHR: positive, category 1  [place]: regular contractions      LABS  @LABRSLTOB(ABORH EXT,LN-ABORH,HML ABO/RH,HGB EXT,HGB,RUBELLA EXT,LN-RUBELLA IGG ANTIBODY:Last:1)@     IMPRESSION  26 year old [unfilled] at 40w0d   single inter uterine pregnancy at term   Pregnancy complications include: None  in labor         PLAN  - Admit to hospital   - Proceed towards delivery   - consider artificial rupture of membranes, - epidural if patient desires and - Anticipate normal spontaneous vaginal delivery    Epi Flower MD        "

## 2021-07-19 NOTE — PROGRESS NOTES
Patient arrived to labor and delivery with complaints of contractions and scant pink blood.    SVE: 3.5/70/-1  UCs: every 5-7 min  Patient rating contractions 6/10.  RN observed [place] spots on patients pad about the size of a quarter.    Dr. Flower updated with patient status. Orders received. Recheck cervix in 1 hour and call MD with update.    RN discussed plan of care with patient. Patient verbalized understanding

## 2021-07-19 NOTE — ANESTHESIA PROCEDURE NOTES
Epidural catheter Procedure Note  Pre-Procedure   Staff -        Anesthesiologist:  Allan Andrews MD       Performed By: anesthesiologist       Location: OB       Procedure Start/Stop Times: 7/19/2021 10:20 AM and 7/19/2021 10:32 AM       Pre-Anesthestic Checklist: patient identified, IV checked, risks and benefits discussed, informed consent, monitors and equipment checked, pre-op evaluation, at physician/surgeon's request and post-op pain management  Timeout:       Correct Patient: Yes        Correct Procedure: Yes        Correct Site: Yes        Correct Position: Yes   Procedure Documentation  Procedure: epidural catheter       Patient Position: sitting       Skin prep: Chloraprep      Local skin infiltrated with mL of 1% lidocaine.        Insertion Site: L2-3. (midline approach).       Technique: LORT air        ISABEL at 8 cm.       Needle Type: Touhy needle       Needle Length (Inches): 5        Catheter: 20 G.         Catheter threaded easily.         5 cm epidural space.         Threaded 13 cm at skin.         # of attempts: 1 and  # of redirects:  0    Assessment/Narrative         Paresthesias: No.       Test dose of 3 mL lidocaine 1.5% w/ 1:200,000 epinephrine at 10:30 CDT.         Test dose negative, 3 minutes after injection, for signs of intravascular, subdural, or intrathecal injection.       Insertion/Infusion Method: LORT air       Aspiration negative for Heme or CSF via Epidural Catheter.

## 2021-07-19 NOTE — ANESTHESIA PREPROCEDURE EVALUATION
Anesthesia Pre-Procedure Evaluation    Patient: Radha Gonzalez   MRN: [id number] : 1995        Preoperative Diagnosis: * No pre-op diagnosis entered *   Procedure : * No procedures listed *     History reviewed. No pertinent past medical history.   History reviewed. No pertinent surgical history.   Allergies   Allergen Reactions     Fentanyl Nausea and Hives      Social History     Tobacco Use     Smoking status: Never Smoker     Smokeless tobacco: Never Used   Substance Use Topics     Alcohol use: Not Currently      Wt Readings from Last 1 Encounters:   21 125.2 kg (276 lb)        Anesthesia Evaluation   Pt has had prior anesthetic.     No history of anesthetic complications       ROS/MED HX  ENT/Pulmonary:  - neg pulmonary ROS     Neurologic:  - neg neurologic ROS     Cardiovascular:  - neg cardiovascular ROS     METS/Exercise Tolerance:     Hematologic:  - neg hematologic  ROS     Musculoskeletal:  - neg musculoskeletal ROS     GI/Hepatic:  - neg GI/hepatic ROS     Renal/Genitourinary:  - neg Renal ROS     Endo:     (+) Obesity,     Psychiatric/Substance Use:  - neg psychiatric ROS     Infectious Disease:  - neg infectious disease ROS     Malignancy:  - neg malignancy ROS     Other:      (+) Possibly pregnant, ,         Physical Exam    Airway         TM distance: > 3 FB   Neck ROM: full   Mouth opening: > 3 cm    Respiratory Devices and Support         Dental  no notable dental history         Cardiovascular          Rhythm and rate: regular and normal     Pulmonary           breath sounds clear to auscultation           OUTSIDE LABS:  CBC: No results found for: WBC, HGB, HCT, PLT  BMP: No results found for: NA, POTASSIUM, CHLORIDE, CO2, BUN, CR, GLC  COAGS: No results found for: PTT, INR, FIBR  POC: No results found for: BGM, HCG, HCGS  HEPATIC: No results found for: ALBUMIN, PROTTOTAL, ALT, AST, GGT, ALKPHOS, BILITOTAL, BILIDIRECT, SHERRIE  OTHER: No results found for: PH, LACT, A1C, DEJA, PHOS,  MAG, LIPASE, AMYLASE, TSH, T4, T3, CRP, SED    Anesthesia Plan    ASA Status:  3      Anesthesia Type: Epidural.              Consents    Anesthesia Plan(s) and associated risks, benefits, and realistic alternatives discussed. Questions answered and patient/representative(s) expressed understanding.     - Discussed with:  Patient         Postoperative Care            Comments:    Patient requests labor epidural.  Chart reviewed, including labs.  I reviewed the options and risks with the patient, including but not limited to: bleeding, infection, damage to tissues under the skin (nerves, muscles, bloodvessels), hypotension, headache, and epidural failure.  The patient's questions were answered and the consent was signed. The patient agrees to elective epidural placement.              NICHOLAS ZAFAR MD

## 2021-07-19 NOTE — L&D DELIVERY NOTE
Delivery Summary    Radha Gonzalez MRN# [id number]   Age: 26 year old YOB: 1995     ASSESSMENT & PLAN: ASVD  Mod Meconium  Male       Dina Gonzalez-Radha [[id number]]    Labor Event Times    Labor onset date: 21 Onset time:  4:00 AM   Dilation complete date: 21 Complete time: 10:45 AM   Start pushing date/time: 2021 1105      Labor Length    1st Stage (hrs): 6 (min): 45   2nd Stage (hrs): 0 (min): 29   3rd Stage (hrs): 0 (min): 5      Labor Events     labor?: No   steroids: None  Labor Type: Spontaneous     Antibiotics received during labor?: No     Rupture date/time:  912   Rupture type: Artificial Rupture of Membranes  Fluid color: Meconium  Fluid odor: Normal     Induction: AROM  Induction date/time:     Cervical ripening date/time:     Indications for induction: Elective     Augmentation: AROM  1:1 continuous labor support provided by?: RN       Delivery/Placenta Date and Time    Delivery Date: 21 Delivery Time: 11:14 AM   Placenta Date/Time: 2021 11:19 AM   Other personnel present at delivery:  Provider Role   Epi Flower MD Physician   Monique Owens RN Registered Nurse   Lois Ayala RN Registered Nurse         Vaginal Counts     Initial count performed by 2 team members:  Two Team Members   Dr. Mundo Owens, RN       Needles Suture Needles Sponges (RETIRED) Instruments   Initial counts       Added to count 1 1 5    Relief counts       Final counts 1 1 5          Placed during labor Accounted for at the end of labor   FSE NA NA   IUPC NA NA   Cervadil NA NA              Final count performed by 2 team members:  Two Team Members   ROBERT Owens MD      Final count correct?: Yes     Apgars    Living status: Living   1 Minute 5 Minute 10 Minute 15 Minute 20 Minute   Skin color: 1  1       Heart rate: 2  2       Reflex irritability: 2  2       Muscle tone: 2  2       Respiratory effort: 2  2       Total: 9  9      "  Apgars assigned by: LOIS MORGAN RN     Cord    Vessels: 3 Vessels    Cord Complications: None               Cord Blood Disposition: Discard    Gases Sent?: No    Delayed cord clamping?: Yes    Cord Clamping Delay (seconds): 31-60 seconds    Stem cell collection?: No       [place] Resuscitation    Methods: None  Output in Delivery Room: Stool     [place] Measurements    Weight: 8 lb 7.5 oz Length: 1' 9\"   Head circumference: 36.8 cm    Output in delivery room: Stool     Skin to Skin and Feeding Plan    Skin to skin initiation date/time: 1841    Skin to skin with: Mother  Skin to skin end date/time:        Labor Events and Shoulder Dystocia    Fetal Tracing Prior to Delivery: Category 1  Shoulder dystocia present?: Neg     Delivery (Maternal) (Provider to Complete) (958603)    Episiotomy: None  Perineal lacerations: 1st    Est. blood loss (mL): 250  Repair suture: 3-0 Vicryl  Number of repair packets: 1  Genital tract inspection done: Pos     Blood Loss  Mother: Radha Gonzalez R #[id number]   Start of Mother's Information    Delivery Blood Loss  21 0400 - 21 1302    EBL (mL) Hospital Encounter 250 mL    Delivery QBL (mL) Hospital Encounter 250 mL    Total  500 mL         End of Mother's Information  Mother: Radha Gonzalez R #[id number]          Delivery - Provider to Complete (209355)    Attempted Delivery Types (Choose all that apply): Spontaneous Vaginal Delivery  Delivery Type (Choose the 1 that will go to the Birth History): Vaginal, Spontaneous                   Other personnel:  Provider Role   Epi Flower MD Physician   Monique Owens RN Registered Nurse   Lois Morgan RN Registered Nurse                Placenta    Date/Time: 2021 11:19 AM  Removal: Spontaneous  Disposition: Hospital disposal           Anesthesia    Method: Epidural                Presentation and Position    Presentation: Vertex    Position: Middle Occiput Anterior                 Epi Ly " MD Mundo

## 2021-07-20 LAB
BLD PROD TYP BPU: NORMAL
BLOOD COMPONENT TYPE: NORMAL
CODING SYSTEM: NORMAL
CROSSMATCH: NORMAL
HGB BLD-MCNC: 6.4 G/DL (ref 11.7–15.7)
ISSUE DATE AND TIME: NORMAL
UNIT ABO/RH: NORMAL
UNIT NUMBER: NORMAL
UNIT STATUS: NORMAL
UNIT TYPE ISBT: 6200

## 2021-07-20 PROCEDURE — 85018 HEMOGLOBIN: CPT

## 2021-07-20 PROCEDURE — 120N000001 HC R&B MED SURG/OB

## 2021-07-20 PROCEDURE — 86923 COMPATIBILITY TEST ELECTRIC: CPT | Performed by: OBSTETRICS & GYNECOLOGY

## 2021-07-20 PROCEDURE — P9016 RBC LEUKOCYTES REDUCED: HCPCS | Performed by: OBSTETRICS & GYNECOLOGY

## 2021-07-20 PROCEDURE — 36415 COLL VENOUS BLD VENIPUNCTURE: CPT

## 2021-07-20 NOTE — PROGRESS NOTES
"Attempted to ambulate patient in room at which time patient denied any complaints and stated she \"felt good.\" Patient was initially dangled without complaints, then stood in place without complaints, but when patient stated to march in place, heart rated on continuous pulse ox monitor increased to to 130's to 140's. Patient then complained of feeling lightheaded and was assisted back to bed. Once in bed, patient reported feeding better and heart rate returned to 110's per previous readings. Noted Dr. Garcia and reported event and symptoms. Order obtained for stat hemoglobin, and to call Dr. Garcia if hgb. Results returned below 7. Updated charge nurse and patient on orders. Will continue to monitor and assess.   "

## 2021-07-20 NOTE — PROGRESS NOTES
Progress Note    Called to the room because of bleeding postpartum. Delivery was uncomplicated.  ml.   Upon my arrival, there was some blood on her pad. I did a bimanual with consent. I removed about 600 ml of blood and clot. I then did an exam within the uterus. No uterine inversion. No retained products. Total blood and clot was about 1200 ml.   IV Pitocin, rectal cytotec, and methergine were given. Bleeding slowed. I stayed in the room for about 20 minutes. Vitals stable. PPH protocol activated.     Kusum Garcia M.D.

## 2021-07-20 NOTE — LACTATION NOTE
"Met with Radha to see how pumping is going and answer questions she has.  This is her second baby, she mostly bottle fed her first baby because of latching problems and wasn't pumping often enough to have a good milk supply.  This time she plans to pump and bottle and has started pumping today, RN instructed her how to use the INITIATE setting on the [employer].  We discussed \"empty breasts make milk\", encouraging her to pump every time baby feeds to get 8 pumpings in 24 hours around the clock.  She has a spectra breast pump for use at home, I gave her the cheatsheet with pump cycle setting guidelines for beginner pumping.  I also referred her to www.exclusivepumping.com and reviewed lactation resources in education folder; OP lactation clinic, online resources and ECFE virtual class.  Will follow up as needed.    "

## 2021-07-20 NOTE — PLAN OF CARE
Problem: Adult Inpatient Plan of Care  Goal: Patient-Specific Goal (Individualized)  Outcome: Improving     Problem: Adult Inpatient Plan of Care  Goal: Absence of Hospital-Acquired Illness or Injury  Intervention: Prevent and Manage VTE (Venous Thromboembolism) Risk  Recent Flowsheet Documentation  Taken 7/20/2021 0501 by Hai Sanchez, RN  VTE Prevention/Management:   ambulation promoted   pneumatic compression device       Patient up to bathroom and ambulated with heart rate noted to be 115 where previously it was elevated to 140's with ambulation. Patient states she is feeling very well and has no dizziness or lightheadedness with ambulation. Hale cath remained in place. Diuresing large amount of urine overnight in hale cath. Scant bleeding. Hai Sanchez, RN

## 2021-07-20 NOTE — PLAN OF CARE
Problem: Bleeding (Postpartum Vaginal Delivery)  Goal: Hemostasis  Outcome: Improving  Intervention: Monitor and Manage Postpartum Bleeding  Recent Flowsheet Documentation  Taken 2021 by Lakisha Moreno RN   Bleed Management: (Fundal check normal) other (see comments)     Problem: Bleeding (Postpartum Vaginal Delivery)  Goal: Hemostasis  Intervention: Monitor and Manage Postpartum Bleeding  Recent Flowsheet Documentation  Taken 2021 by Lakisha Moreno RN   Bleed Management: (Fundal check normal) other (see comments)  AIDET completed. Patient is A&O X 4. Cephalocaudal assessment done. See flow sheet. Continuous pulse ox applied. [id number]% on room air. Rubra light. No clots noted. Fundus is firm at U/1. Patient denies pain, ligtheadedness, dizziness, numbness, tingling, chest pain, SOB, or any other complaints. Instructed to report any complaints. General color is pale. Skin is warm and dry. Long draining catherine colored urine. Emptied for 350 ml's. Will continue to monitor and assess.

## 2021-07-20 NOTE — PROGRESS NOTES
In House OB    Vitals and hemoglobin reviewed. Plan to draw Hgb in the morning and assess patient's status. Depending on this, she may need a blood transfusion.     Kusum Garcia M.D.

## 2021-07-20 NOTE — PROGRESS NOTES
"PPD#1    ASSESSMENT: PLAN:   PPD#1 spontaneous vaginal delivery,  Acute blood loss anemia- will monitor for signs or symptoms of symptomatic anemia                Will transfuse if needed.    SUBJECTIVE:  Patient sleeping    OBJECTIVE:  /51   Pulse 88   Temp 98.4  F (36.9  C) (Oral)   Resp 14   Ht 1.727 m (5' 8\")   Wt 125.2 kg (276 lb)   SpO2 100%   Breastfeeding Unknown   BMI 41.97 kg/m      Hemoglobin   Date Value Ref Range Status   07/20/2021 6.4 (LL) 11.7 - 15.7 g/dL Final         Will check in on patient later today    Margi White MD  [facility]  [phone]    "

## 2021-07-20 NOTE — ANESTHESIA POSTPROCEDURE EVALUATION
Patient: Radha Gonzalez    * No procedures listed *    Diagnosis:* No pre-op diagnosis entered *  Diagnosis Additional Information: No value filed.    Anesthesia Type:  Epidural    Note:     Postop Pain Control: Uneventful            Sign Out: Well controlled pain   PONV: No   Neuro/Psych: Uneventful            Sign Out: Acceptable/Baseline neuro status   Airway/Respiratory: Uneventful            Sign Out: Acceptable/Baseline resp. status   CV/Hemodynamics: Uneventful            Sign Out: Acceptable CV status; No obvious hypovolemia; No obvious fluid overload   Other NRE: NONE   DID A NON-ROUTINE EVENT OCCUR?            Last vitals:  Vitals:    07/19/21 1830 07/19/21 2025 07/20/21 0056   BP: 109/63 116/53 110/55   Pulse: 112 114 105   Resp: 18 16 16   Temp:  37.1  C (98.8  F) 36.7  C (98.1  F)   SpO2: 98% 100% 98%       Last vitals prior to Anesthesia Care Transfer:    No apparent complications from labor epidural.    Electronically Signed By: Pino Morgan MD  July 20, 2021  4:46 AM

## 2021-07-21 VITALS
RESPIRATION RATE: 16 BRPM | OXYGEN SATURATION: 98 % | BODY MASS INDEX: 41.83 KG/M2 | HEIGHT: 68 IN | SYSTOLIC BLOOD PRESSURE: 125 MMHG | WEIGHT: 276 LBS | TEMPERATURE: 98.3 F | DIASTOLIC BLOOD PRESSURE: 59 MMHG | HEART RATE: 75 BPM

## 2021-07-21 PROBLEM — Z34.90 PREGNANCY: Status: RESOLVED | Noted: 2021-07-19 | Resolved: 2021-07-21

## 2021-07-21 PROBLEM — Z36.89 ENCOUNTER FOR TRIAGE IN PREGNANT PATIENT: Status: RESOLVED | Noted: 2021-07-19 | Resolved: 2021-07-21

## 2021-07-21 PROBLEM — D62 ANEMIA DUE TO BLOOD LOSS, ACUTE: Status: ACTIVE | Noted: 2021-07-21

## 2021-07-21 LAB — HGB BLD-MCNC: 7.6 G/DL (ref 11.7–15.7)

## 2021-07-21 PROCEDURE — 85018 HEMOGLOBIN: CPT | Performed by: OBSTETRICS & GYNECOLOGY

## 2021-07-21 PROCEDURE — 250N000013 HC RX MED GY IP 250 OP 250 PS 637

## 2021-07-21 PROCEDURE — 36415 COLL VENOUS BLD VENIPUNCTURE: CPT | Performed by: OBSTETRICS & GYNECOLOGY

## 2021-07-21 RX ADMIN — BENZOCAINE AND LEVOMENTHOL: 200; 5 SPRAY TOPICAL at 09:57

## 2021-07-21 RX ADMIN — IBUPROFEN 800 MG: 800 TABLET ORAL at 01:35

## 2021-07-21 RX ADMIN — IBUPROFEN 800 MG: 800 TABLET ORAL at 09:52

## 2021-07-21 NOTE — PLAN OF CARE
Problem: Adult Inpatient Plan of Care  Goal: Absence of Hospital-Acquired Illness or Injury  Outcome: Improving  Intervention: Prevent and Manage VTE (Venous Thromboembolism) Risk  Recent Flowsheet Documentation  Taken 7/21/2021 0135 by Hai Sanchez RN  VTE Prevention/Management: ambulation promoted  Intervention: Prevent Infection  Recent Flowsheet Documentation  Taken 7/21/2021 0135 by Hai Sanchez RN  Infection Prevention: hand hygiene promoted     Problem: Adult Inpatient Plan of Care  Goal: Optimal Comfort and Wellbeing  Outcome: Improving     Radha was fiercly independent last night and had no concerns for this RN. She took ibuprofen x1 for mild cramping pain. She denies any feelings of lightheadedness or dizziness and was very steady on her feet. Vital signs remained stable with good color. She is taking good PO and fluids and voiding adequately. Radha is hopeful to go home today. She is very attentive to her and her babies needs and has an attentive partner at her side. Hai Sanchez RN

## 2021-07-21 NOTE — DISCHARGE SUMMARY
[facility] Discharge Summary    Radha Gonzalez MRN# [id number]   Age: 26 year old YOB: 1995     Date of Admission:  7/19/2021  Date of Discharge::  7/21/2021  Admitting Physician:  Epi Floewr MD  Discharge Physician:  Margi White MD     Home clinic: [facility]            Admission Diagnoses:   Encounter for triage in pregnant patient [Z36.89]  Pregnancy [Z34.90]          Discharge Diagnosis:   Normal spontaneous vaginal delivery  Intrauterine pregnancy at 40 weeks gestation          Procedures:   Procedure(s): spontaneous vaginal delivery    Blood transfusion                   Medications Prior to Admission:     Medications Prior to Admission   Medication Sig Dispense Refill Last Dose     Prenatal Vit-Fe Fumarate-FA (PRENATAL MULTIVITAMIN W/IRON) 27-0.8 MG tablet Take 1 tablet by mouth daily                Discharge Medications:     Current Discharge Medication List      CONTINUE these medications which have NOT CHANGED    Details   Prenatal Vit-Fe Fumarate-FA (PRENATAL MULTIVITAMIN W/IRON) 27-0.8 MG tablet Take 1 tablet by mouth daily                   Consultations:   No consultations were requested during this admission          Brief History of Labor:   Admitted in labor            Hospital Course:   The patient's hospital course was unremarkable.  On discharge, her pain was well controlled. Vaginal bleeding is similar to peak menstrual flow.  Voiding without difficulty.  Ambulating well and tolerating a normal diet.  No fever.  Breastfeeding well.  Infant is stable.  No bowel movement yet.*  She was discharged on post-partum day #2.    Post-partum hemoglobin:   Hemoglobin   Date Value Ref Range Status   07/20/2021 6.4 (LL) 11.7 - 15.7 g/dL Final             Discharge Instructions and Follow-Up:   Discharge diet: Regular   Discharge activity: No sex for 6 week(s)   Discharge follow-up: Follow up with Dr. Flower in 6 weeks   Wound care:             Discharge Disposition:   Discharged to home      Attestation:  I have reviewed today's vital signs, notes, medications, labs and imaging.    Margi White MD

## 2021-07-21 NOTE — PROGRESS NOTES
Outreach Note for [facility]    Radha Gonzalez  [id number]  1995    Chart reviewed, discharge follow-up plan discussed with patient, needs assessed. Patient requests all follow-up through clinic/physician. Patient declines home care visit. Patient states she has good support at home, has baby care essentials, and feels ready to discharge today.    Outreach RN will continue to follow and assist if needed with discharge plan. No further needs identified at this time.    Completed by: Kim Seipel RN

## 2021-07-21 NOTE — PLAN OF CARE
Problem: Adult Inpatient Plan of Care  Goal: Readiness for Transition of Care  Outcome: Adequate for Discharge   Patient's pain is well controlled, and lochia WNL.  Patient is independent with self and infant cares. Discharge instructions reviewed with patient including danger signs and when to call the doctor. Patient verbalizes understanding and readiness to discharge to home.

## 2021-08-02 NOTE — PROGRESS NOTES
Progress Notes by Charlotte Akhtar, RN at 7/7/2021  5:22 PM     Author: Charlotte Akhtar, RN Service: -- Author Type: Registered Nurse    Filed: 7/7/2021  5:24 PM Date of Service: 7/7/2021  5:22 PM Status: Signed    : Charlotte Akhtar RN (Registered Nurse)       Patient here from clinic visit downstairs, had decreased fetal movement earlier today, BPP in clinic was 8 out of 8 and reactive NST done. SVE done in clinic and membranes stripped by Dr. Prakash. Patient wished to have extended monitoring, having some light cramping at this time, denies loss of fluid or bleeding. EFM placed, VS obtained.

## 2021-08-02 NOTE — PROGRESS NOTES
Progress Notes by Kailey Anguiano RN at 7/7/2021  6:53 PM     Author: Kailey Anguiano RN Service: -- Author Type: Registered Nurse    Filed: 7/7/2021  6:56 PM Date of Service: 7/7/2021  6:53 PM Status: Signed    : Kailey Anguiano RN (Registered Nurse)       Pt FHR cat 1 with accelerations, 60 minutes of monitoring completed over 1.75 hours.  Pt complaining of left ear pain, informed Sandra Mackay MD, she will make a determination of pt needs to be seen in an urgent care or can be treated here.

## 2021-08-22 ENCOUNTER — HEALTH MAINTENANCE LETTER (OUTPATIENT)
Age: 26
End: 2021-08-22

## 2021-10-17 ENCOUNTER — HEALTH MAINTENANCE LETTER (OUTPATIENT)
Age: 26
End: 2021-10-17

## 2022-10-01 ENCOUNTER — HEALTH MAINTENANCE LETTER (OUTPATIENT)
Age: 27
End: 2022-10-01

## 2022-12-17 ENCOUNTER — E-VISIT (OUTPATIENT)
Dept: INTERNAL MEDICINE | Facility: CLINIC | Age: 27
End: 2022-12-17

## 2022-12-17 DIAGNOSIS — H92.09 OTALGIA, UNSPECIFIED LATERALITY: Primary | ICD-10-CM

## 2022-12-17 PROCEDURE — 99207 PR NON-BILLABLE SERV PER CHARTING: CPT | Performed by: NURSE PRACTITIONER

## 2022-12-19 ENCOUNTER — TELEPHONE (OUTPATIENT)
Dept: INTERNAL MEDICINE | Facility: CLINIC | Age: 27
End: 2022-12-19

## 2022-12-19 NOTE — TELEPHONE ENCOUNTER
For some reason I received this patient's E-visit for ear pain.  I have not seen her since 2018.  Regardless, for ear pain she needs to be seen face-to-face.  I recommend that she goes to walk-in care for evaluation of the ear pain.  E-visit will be canceled.

## 2022-12-19 NOTE — PATIENT INSTRUCTIONS
Thank you for choosing us for your care. Based on the information provided, I believe you need to be seen immediately.  Please go  to walk-in care or an urgent care  as soon as possible.     You will not be charged for this eVisit.

## 2022-12-19 NOTE — TELEPHONE ENCOUNTER
Left message for patient in regards of cancellation of E-visit. If patient calls back refer to providers message below.    Luisa Esparza MA on 12/19/2022 at 2:28 PM

## 2023-10-15 ENCOUNTER — HEALTH MAINTENANCE LETTER (OUTPATIENT)
Age: 28
End: 2023-10-15

## 2024-12-08 ENCOUNTER — HEALTH MAINTENANCE LETTER (OUTPATIENT)
Age: 29
End: 2024-12-08